# Patient Record
Sex: MALE | Race: WHITE | Employment: FULL TIME | URBAN - METROPOLITAN AREA
[De-identification: names, ages, dates, MRNs, and addresses within clinical notes are randomized per-mention and may not be internally consistent; named-entity substitution may affect disease eponyms.]

---

## 2020-05-29 ENCOUNTER — HOSPITAL ENCOUNTER (EMERGENCY)
Age: 35
Discharge: ANOTHER HOSPITAL | End: 2020-05-30
Attending: EMERGENCY MEDICINE
Payer: SELF-PAY

## 2020-05-29 ENCOUNTER — APPOINTMENT (OUTPATIENT)
Dept: CT IMAGING | Age: 35
End: 2020-05-29
Attending: EMERGENCY MEDICINE
Payer: SELF-PAY

## 2020-05-29 DIAGNOSIS — S06.0X9A CONCUSSION WITH LOSS OF CONSCIOUSNESS, INITIAL ENCOUNTER: ICD-10-CM

## 2020-05-29 DIAGNOSIS — S02.119A FRACTURE OF OCCIPITAL BONE OF SKULL WITH LOSS OF CONSCIOUSNESS (HCC): Primary | ICD-10-CM

## 2020-05-29 DIAGNOSIS — S06.9X9A FRACTURE OF OCCIPITAL BONE OF SKULL WITH LOSS OF CONSCIOUSNESS (HCC): Primary | ICD-10-CM

## 2020-05-29 PROCEDURE — 99284 EMERGENCY DEPT VISIT MOD MDM: CPT

## 2020-05-29 PROCEDURE — 70450 CT HEAD/BRAIN W/O DYE: CPT

## 2020-05-29 PROCEDURE — 75810000293 HC SIMP/SUPERF WND  RPR

## 2020-05-30 VITALS
TEMPERATURE: 98.1 F | HEIGHT: 67 IN | BODY MASS INDEX: 26.68 KG/M2 | SYSTOLIC BLOOD PRESSURE: 141 MMHG | HEART RATE: 81 BPM | DIASTOLIC BLOOD PRESSURE: 79 MMHG | OXYGEN SATURATION: 99 % | RESPIRATION RATE: 16 BRPM | WEIGHT: 170 LBS

## 2020-05-30 NOTE — ED NOTES
Report to [employer], rn at [facility]. Transport team is here to take pt to .   No issues at this time

## 2020-05-30 NOTE — ED NOTES
Pt will be transferred to [facility] for suspected skull fracture. Have verified no need for c-spine immobilization.

## 2020-05-30 NOTE — ED NOTES
Pt presents to the ed after a fall from a skateboard. He fell and hit the back of his head and has a laceration with bleeding controlled at this time. Pt reports an loc for unknown time. On arrival to the ed he is awake and alert, answering all questions appropriately. Pt is able to move all extremities well. No deformity to extremities. Pt was ambulatory into the department. Is not complaining of neck or back pain, does complain of slight headache but states he does not want any medication for same.

## 2020-05-30 NOTE — ED PROVIDER NOTES
EMERGENCY DEPARTMENT HISTORY AND PHYSICAL EXAM      Date: 5/29/2020  Patient Name: Yosi Last    History of Presenting Illness     Chief Complaint   Patient presents with    Head Injury    Laceration       History (Context): Yosi Last is a 30 y.o. gentleman with no significant past medical history who presents with acute onset, very mild diffuse headache, after prolonged loss of consciousness after falling off of his skateboard and striking his occiput. The patient also has associated laceration and bleeding from said head strike. The first thing the patient remembers is being woken up by the police    On review of systems, the patient denies neck pain, facial pain, chest pain, back pain, abdominal pain, pelvic pain, extremity pain, numbness, weakness, tingling. PCP: None        Past History     Past Medical History:  History reviewed. No pertinent past medical history. Past Surgical History:  History reviewed. No pertinent surgical history. Family History:  History reviewed. No pertinent family history. Social History:  Social History     Tobacco Use    Smoking status: Not on file   Substance Use Topics    Alcohol use: Not on file    Drug use: Not on file       Allergies:  No Known Allergies    PMH, PSH, family history, social history, allergies reviewed with the patient with significant items noted above. Review of Systems   As per HPI, otherwise reviewed and negative. Physical Exam     Vitals:    05/29/20 2305   BP: 154/84   Pulse: 73   Resp: 18   Temp: 97.6 °F (36.4 °C)   SpO2: 100%   Weight: 77.1 kg (170 lb)   Height: 5' 7\" (1.702 m)       Gen: Well-appearing, in no acute distress   HEENT: Laceration on the occiput approximately 4 cm in V-shaped, normocephalic, sclera anicteric, no pearson sign, no raccoon eyes, no hemotympanum, trachea is midline. Cardiovascular: Normal rate, regular rhythm, no murmurs, rubs, gallops.   Radial pulses 2+, dorsalis pedis pulses 2+  Pulmonary: No bruising or crepitus to the chest.  Bilateral breath sounds equal with equal chest rise. No respiratory distress. No stridor. Clear lungs. ABD: Soft, nontender, nondistended. No seatbelt sign. Neuro: GCS 15. Alert. PERRLA. Normal speech. Normal mentation. Full strength and sensation throughout. Psych: Normal thought content and thought processes. : No CVA tenderness. Pelvis stable  EXT: Moves all extremities well. No cyanosis or clubbing. Skin: Warm and well-perfused. Other:        Diagnostic Study Results     Labs -   No results found for this or any previous visit (from the past 12 hour(s)). Radiologic Studies -   CT HEAD WO CONT   Final Result   IMPRESSION:      Right dorsal scalp hematoma and nondisplaced left parasagittal occipital   fracture. No evidence for acute intracranial injury, cortical infarct,   hemorrhage, or mass effect. CT can be negative in acute setting. CT Results  (Last 48 hours)               05/29/20 2653  CT HEAD WO CONT Final result    Impression:  IMPRESSION:       Right dorsal scalp hematoma and nondisplaced left parasagittal occipital   fracture. No evidence for acute intracranial injury, cortical infarct,   hemorrhage, or mass effect. CT can be negative in acute setting. Narrative:  CT head without contrast       INDICATION: Headache. COMPARISON: None. TECHNIQUE: Axial CT imaging of the head was performed without intravenous   contrast. One or more dose reduction techniques were used on this CT: automated   exposure control, adjustment of the mAs and/or kVp according to patient size,   and iterative reconstruction techniques. The specific techniques used on this   CT exam have been documented in the patient's electronic medical record.  Digital   Imaging and Communications in Medicine (DICOM) format image data are available   to nonaffiliated external healthcare facilities or entities on a secure, media   free, reciprocally searchable basis with patient authorization for at least a   12-month period after this study. _______________       FINDINGS:       BRAIN AND POSTERIOR FOSSA: The sulci, folia, ventricles and basal cisterns are   within normal limits for the patient's age. There is no intracranial hemorrhage,   mass effect, or midline shift. There are no areas of abnormal parenchymal   attenuation. EXTRA-AXIAL SPACES AND MENINGES: There are no abnormal extra-axial fluid   collections. CALVARIUM: Nondisplaced left parasagittal occipital fracture. . Right   parasagittal dorsal scalp hematoma noted. SINUSES: Clear. OTHER: None.       _______________               CXR Results  (Last 48 hours)    None            Medical Decision Making   I am the first provider for this patient. I reviewed the vital signs, available nursing notes, past medical history, past surgical history, family history and social history. Vital Signs-Reviewed the patient's vital signs. Records Reviewed: Personally, on initial evaluation    MDM:   Patient presents with head strike prolonged loss of consciousness after fall from skateboard with associated laceration. Exam significant for laceration on the occiput with a normal neurologic exam.   DDX considered likely in this particular patient: Traumatic brain injury, skull fracture  DDX always considered in trauma patient: facial fractures, pneumothorax, cervical spine fracture (Knox C-spine rule negative) skeletal fractures, dislocations, intrathoracic or intra-abdominal bleeding or injury to organs, active bleeding, pelvic fracture, nonaccidental trauma. Patient condition on initial evaluation: Stable    Plan:   Close observation  Orders as below:  Orders Placed This Encounter    CT HEAD WO CONT        ED Course: On arrival, patient went to CT scan. CT showed occipital fracture traversing the left transverse sinus, which will require observation in trauma center. I called the trauma center and spoke with the attending trauma surgeon, who accepted the patient at [facility] [place] [facility].  After his call, a secondary wet read of the head CT came back with suggestion of acute subdural hematoma due to thickening of the right tentorium. I contacted the attending radiologist to reconcile the differences. Reviewed by attending radiologist says this is possible, so recommends repeat CT in 6 hours. Called [facility] to inform them of the change    Patient condition at time of disposition: Stable    Procedures:  Wound Repair  Date/Time: 5/30/2020 2:02 AM  Performed by: attendingPreparation: skin prepped with Shur-Clens  Location details: scalp  Wound length: 4 cm V-shaped. Foreign bodies: no foreign bodies  Irrigation solution: saline  Irrigation method: tap  Debridement: none  Wound skin closure material used: Staples. Number of sutures: 7  Technique: simple  Approximation: close  Patient tolerance: Patient tolerated the procedure well with no immediate complications  My total time at bedside, performing this procedure was 1-15 minutes. Critical Care Time:  The services I provided to this patient were to treat and/or prevent clinically significant deterioration that could result in the failure of one or more body systems and/or organ systems due to concussion, skull fracture, laceration and head trauma necessitating transfer. Services included the following:  -reviewing nursing notes and old charts  -vital sign assessments  -direct patient care  -medication orders and management  -interpreting and reviewing diagnostic studies/labs  -re-evaluations  -documentation time    Aggregate critical care time was 50 minutes, which includes only time during which I was engaged in work directly related to the patient's care as described above, whether I was at bedside or elsewhere in the Emergency Department.  It did not include time spent performing other reported procedures or the services of residents, students, nurses, or advance practice providers. Juan Manuel Melo MD    2:03 AM      Disposition: Transfer to [facility]    Diagnosis     Clinical Impression:   1. Fracture of occipital bone of skull with loss of consciousness ([facility] [address]    2. Concussion with loss of consciousness, initial encounter        Signed,  Valentin Bernstein MD  Emergency Physician  [facility]    As a voice dictation software was utilized to dictate this note, minor word transpositions can occur. I apologize for confusing wording and typographic errors. Please feel free to contact me for clarification.

## 2020-05-30 NOTE — ED TRIAGE NOTES
Pt to triage c/o head injury, states he had a few beers tonight and took his dog for a skateboard/walk. Dog pulled and pt fell backwards off skateboard onto ground with loss of consciousness. Unknown down time-states he woke to police asking him questions. Now with some dizziness, states he doesn't know how the back of his head looks, has bandages from EMS controlling bleeding at this time.   Unknown last tetanus